# Patient Record
Sex: FEMALE | Race: WHITE | HISPANIC OR LATINO
[De-identification: names, ages, dates, MRNs, and addresses within clinical notes are randomized per-mention and may not be internally consistent; named-entity substitution may affect disease eponyms.]

---

## 2017-11-01 ENCOUNTER — TRANSCRIPTION ENCOUNTER (OUTPATIENT)
Age: 27
End: 2017-11-01

## 2018-06-11 PROBLEM — Z00.00 ENCOUNTER FOR PREVENTIVE HEALTH EXAMINATION: Status: ACTIVE | Noted: 2018-06-11

## 2018-07-03 ENCOUNTER — APPOINTMENT (OUTPATIENT)
Dept: ORTHOPEDIC SURGERY | Facility: CLINIC | Age: 28
End: 2018-07-03
Payer: MEDICAID

## 2018-07-03 VITALS
HEART RATE: 76 BPM | HEIGHT: 62 IN | SYSTOLIC BLOOD PRESSURE: 108 MMHG | WEIGHT: 135 LBS | BODY MASS INDEX: 24.84 KG/M2 | DIASTOLIC BLOOD PRESSURE: 66 MMHG

## 2018-07-03 DIAGNOSIS — M51.36 OTHER INTERVERTEBRAL DISC DEGENERATION, LUMBAR REGION: ICD-10-CM

## 2018-07-03 DIAGNOSIS — M76.899 OTHER SPECIFIED ENTHESOPATHIES OF UNSPECIFIED LOWER LIMB, EXCLUDING FOOT: ICD-10-CM

## 2018-07-03 PROCEDURE — 99204 OFFICE O/P NEW MOD 45 MIN: CPT

## 2018-07-03 PROCEDURE — 72100 X-RAY EXAM L-S SPINE 2/3 VWS: CPT

## 2018-07-03 RX ORDER — NAPROXEN 500 MG/1
500 TABLET ORAL
Qty: 30 | Refills: 0 | Status: ACTIVE | COMMUNITY
Start: 2018-02-27

## 2018-07-03 RX ORDER — NAPROXEN 500 MG/1
500 TABLET, DELAYED RELEASE ORAL
Qty: 20 | Refills: 0 | Status: ACTIVE | COMMUNITY
Start: 2018-04-14

## 2018-07-19 ENCOUNTER — OTHER (OUTPATIENT)
Age: 28
End: 2018-07-19

## 2018-07-19 DIAGNOSIS — M25.569 PAIN IN UNSPECIFIED KNEE: ICD-10-CM

## 2018-08-22 ENCOUNTER — APPOINTMENT (OUTPATIENT)
Dept: INTERNAL MEDICINE | Facility: CLINIC | Age: 28
End: 2018-08-22
Payer: MEDICAID

## 2018-08-22 VITALS
BODY MASS INDEX: 24.84 KG/M2 | WEIGHT: 135 LBS | OXYGEN SATURATION: 99 % | DIASTOLIC BLOOD PRESSURE: 60 MMHG | TEMPERATURE: 98.4 F | SYSTOLIC BLOOD PRESSURE: 100 MMHG | HEART RATE: 65 BPM | HEIGHT: 62 IN

## 2018-08-22 DIAGNOSIS — R20.2 PARESTHESIA OF SKIN: ICD-10-CM

## 2018-08-22 DIAGNOSIS — Z78.9 OTHER SPECIFIED HEALTH STATUS: ICD-10-CM

## 2018-08-22 PROCEDURE — 99204 OFFICE O/P NEW MOD 45 MIN: CPT

## 2018-08-22 NOTE — HISTORY OF PRESENT ILLNESS
[FreeTextEntry8] : pt here today with numbness in the right foot.  Symptoms started 7 days ago.\par Pt now in PT for right hip pain and previouly followed by orthopeist.\par Pt denies any weakness or pain.\par Denies any foot drop

## 2018-08-22 NOTE — ASSESSMENT
[FreeTextEntry1] : Tingling in the right foot- could be 2nd to pinched nerve. \par Pt to take NSAIDs and referral to neuro for evaluation. Possible EMG

## 2018-08-29 ENCOUNTER — OTHER (OUTPATIENT)
Age: 28
End: 2018-08-29

## 2018-09-06 ENCOUNTER — APPOINTMENT (OUTPATIENT)
Dept: ORTHOPEDIC SURGERY | Facility: CLINIC | Age: 28
End: 2018-09-06

## 2018-09-10 ENCOUNTER — OTHER (OUTPATIENT)
Age: 28
End: 2018-09-10

## 2018-11-13 ENCOUNTER — APPOINTMENT (OUTPATIENT)
Dept: FAMILY MEDICINE | Facility: CLINIC | Age: 28
End: 2018-11-13

## 2018-12-04 ENCOUNTER — OTHER (OUTPATIENT)
Age: 28
End: 2018-12-04

## 2018-12-04 ENCOUNTER — APPOINTMENT (OUTPATIENT)
Dept: FAMILY MEDICINE | Facility: CLINIC | Age: 28
End: 2018-12-04

## 2019-04-02 ENCOUNTER — TRANSCRIPTION ENCOUNTER (OUTPATIENT)
Age: 29
End: 2019-04-02

## 2019-06-07 ENCOUNTER — APPOINTMENT (OUTPATIENT)
Dept: OTOLARYNGOLOGY | Facility: CLINIC | Age: 29
End: 2019-06-07

## 2022-03-09 ENCOUNTER — EMERGENCY (EMERGENCY)
Facility: HOSPITAL | Age: 32
LOS: 1 days | Discharge: HOME | End: 2022-03-09
Attending: EMERGENCY MEDICINE
Payer: COMMERCIAL

## 2022-03-09 VITALS
TEMPERATURE: 98 F | OXYGEN SATURATION: 100 % | SYSTOLIC BLOOD PRESSURE: 115 MMHG | HEIGHT: 62 IN | DIASTOLIC BLOOD PRESSURE: 70 MMHG | WEIGHT: 125 LBS | RESPIRATION RATE: 18 BRPM | HEART RATE: 89 BPM

## 2022-03-09 LAB
ALBUMIN SERPL ELPH-MCNC: 4.1 G/DL — SIGNIFICANT CHANGE UP (ref 3.3–5.2)
ALP SERPL-CCNC: 82 U/L — SIGNIFICANT CHANGE UP (ref 40–120)
ALT FLD-CCNC: 9 U/L — SIGNIFICANT CHANGE UP
ANION GAP SERPL CALC-SCNC: 15 MMOL/L — SIGNIFICANT CHANGE UP (ref 5–17)
APTT BLD: 36 SEC — HIGH (ref 27.5–35.5)
AST SERPL-CCNC: 15 U/L — SIGNIFICANT CHANGE UP
BASOPHILS # BLD AUTO: 0.07 K/UL — SIGNIFICANT CHANGE UP (ref 0–0.2)
BASOPHILS NFR BLD AUTO: 0.6 % — SIGNIFICANT CHANGE UP (ref 0–2)
BILIRUB SERPL-MCNC: 0.4 MG/DL — SIGNIFICANT CHANGE UP (ref 0.4–2)
BUN SERPL-MCNC: 18.9 MG/DL — SIGNIFICANT CHANGE UP (ref 8–20)
CALCIUM SERPL-MCNC: 8.6 MG/DL — SIGNIFICANT CHANGE UP (ref 8.6–10.2)
CHLORIDE SERPL-SCNC: 102 MMOL/L — SIGNIFICANT CHANGE UP (ref 98–107)
CO2 SERPL-SCNC: 21 MMOL/L — LOW (ref 22–29)
CREAT SERPL-MCNC: 0.67 MG/DL — SIGNIFICANT CHANGE UP (ref 0.5–1.3)
EGFR: 118 ML/MIN/1.73M2 — SIGNIFICANT CHANGE UP
EOSINOPHIL # BLD AUTO: 0.45 K/UL — SIGNIFICANT CHANGE UP (ref 0–0.5)
EOSINOPHIL NFR BLD AUTO: 3.6 % — SIGNIFICANT CHANGE UP (ref 0–6)
GLUCOSE SERPL-MCNC: 83 MG/DL — SIGNIFICANT CHANGE UP (ref 70–99)
HCG SERPL-ACNC: <4 MIU/ML — SIGNIFICANT CHANGE UP
HCT VFR BLD CALC: 35.7 % — SIGNIFICANT CHANGE UP (ref 34.5–45)
HGB BLD-MCNC: 12 G/DL — SIGNIFICANT CHANGE UP (ref 11.5–15.5)
IMM GRANULOCYTES NFR BLD AUTO: 0.2 % — SIGNIFICANT CHANGE UP (ref 0–1.5)
INR BLD: 1.07 RATIO — SIGNIFICANT CHANGE UP (ref 0.88–1.16)
LYMPHOCYTES # BLD AUTO: 2.65 K/UL — SIGNIFICANT CHANGE UP (ref 1–3.3)
LYMPHOCYTES # BLD AUTO: 20.9 % — SIGNIFICANT CHANGE UP (ref 13–44)
MCHC RBC-ENTMCNC: 28.8 PG — SIGNIFICANT CHANGE UP (ref 27–34)
MCHC RBC-ENTMCNC: 33.6 GM/DL — SIGNIFICANT CHANGE UP (ref 32–36)
MCV RBC AUTO: 85.8 FL — SIGNIFICANT CHANGE UP (ref 80–100)
MONOCYTES # BLD AUTO: 0.98 K/UL — HIGH (ref 0–0.9)
MONOCYTES NFR BLD AUTO: 7.7 % — SIGNIFICANT CHANGE UP (ref 2–14)
NEUTROPHILS # BLD AUTO: 8.47 K/UL — HIGH (ref 1.8–7.4)
NEUTROPHILS NFR BLD AUTO: 67 % — SIGNIFICANT CHANGE UP (ref 43–77)
PLATELET # BLD AUTO: 315 K/UL — SIGNIFICANT CHANGE UP (ref 150–400)
POTASSIUM SERPL-MCNC: 3.6 MMOL/L — SIGNIFICANT CHANGE UP (ref 3.5–5.3)
POTASSIUM SERPL-SCNC: 3.6 MMOL/L — SIGNIFICANT CHANGE UP (ref 3.5–5.3)
PROT SERPL-MCNC: 6.9 G/DL — SIGNIFICANT CHANGE UP (ref 6.6–8.7)
PROTHROM AB SERPL-ACNC: 12.4 SEC — SIGNIFICANT CHANGE UP (ref 10.5–13.4)
RBC # BLD: 4.16 M/UL — SIGNIFICANT CHANGE UP (ref 3.8–5.2)
RBC # FLD: 14.7 % — HIGH (ref 10.3–14.5)
SODIUM SERPL-SCNC: 138 MMOL/L — SIGNIFICANT CHANGE UP (ref 135–145)
WBC # BLD: 12.65 K/UL — HIGH (ref 3.8–10.5)
WBC # FLD AUTO: 12.65 K/UL — HIGH (ref 3.8–10.5)

## 2022-03-09 PROCEDURE — 99285 EMERGENCY DEPT VISIT HI MDM: CPT

## 2022-03-09 PROCEDURE — 73701 CT LOWER EXTREMITY W/DYE: CPT | Mod: 26,RT,MA

## 2022-03-09 NOTE — ED ADULT NURSE REASSESSMENT NOTE - NS ED NURSE REASSESS COMMENT FT1
pt received A&Ox4. OOB self. Safety precautions maintained. Hourly rounding, call bell in reach, bed locked and in lowest position. Pt awaiting CT results.

## 2022-03-09 NOTE — ED STATDOCS - PROGRESS NOTE DETAILS
Bed side US + abscess to right inner thigh. Pt will have CT Right lower extremity to evaluate for deep tissue infection. Pt moved form intake Room. Pt seen and evaluated by intake Physician. HPI, Physical examination performed by intake Physician . Note reviewed and followup examination performed by me consistent with initial assessment. Agrees with intake Physician plan and tests. Pt case discussed with HANK Ruiz and will obtain CT and treat appropriate. CT + for cellulitis only,. pt is already on bactrim - nargis. sherry

## 2022-03-09 NOTE — ED ADULT TRIAGE NOTE - CHIEF COMPLAINT QUOTE
PT presents to ED c/o right inner thigh infection.  PT States had medial thigh lift 1/6 in [place]. Returned 2/17 to have stitches removed.  Area draining blood and pus.  Seen at urgent care and given bactrim and duracef. No fevers.

## 2022-03-09 NOTE — ED STATDOCS - PHYSICAL EXAMINATION
Gen: NAD, AOx3  Head: NCAT  HEENT: EOMI, oral mucosa moist, normal conjunctiva, neck supple  Lung: no respiratory distress  CV:  Normal perfusion  MSK: No edema, no visible deformities  Neuro: No focal neurologic deficits  Skin: Erythema along border of right surgical incision with mild warmth, small ulceration approximately 1x1 cm with serous drainage distal to surgical site, 2x2 area of fluctuance without significant tenderness.   Psych: normal affect

## 2022-03-09 NOTE — ED STATDOCS - PATIENT PORTAL LINK FT
You can access the FollowMyHealth Patient Portal offered by [facility] by registering at the following website: [email or web address]. By joining [employer]’s FollowMyHealth portal, you will also be able to view your health information using other applications (apps) compatible with our system.

## 2022-03-09 NOTE — ED ADULT NURSE NOTE - OBJECTIVE STATEMENT
33 yo female complaining of right inner thigh discomfort. Pt recently had thigh lift. Pt right inner thigh is hot to touch, red and swollen. It is accompanied by a 3 blister-like lesions. Pt denies n/v/d, and chills. Pt states she went to urgent care 1 days ago and was prescribed antibiotics and ointment to take and put on area that is affected.

## 2022-03-09 NOTE — ED STATDOCS - OBJECTIVE STATEMENT
31 y/o female with no PMHx presents to ED c/o post op complication. Patient reports she had a medial thigh lit in [place] on 1/06/22, and had the stitches removed on 2/17/22. Reports she has been having pain and redness to the area on her right thigh, so she saw her PMD today who prescribed Bactrim and Cefadroxil. Has only taken one dose of the abx.     Denies fevers

## 2022-03-09 NOTE — ED STATDOCS - ATTENDING CONTRIBUTION TO CARE
I, Dina Reyes, performed the initial face to face bedside interview with this patient regarding history of present illness, review of symptoms and relevant past medical, social and family history.  I completed an independent physical examination.   The medical decision making and follow-up on ordered tests (ie labs, radiologic studies) and re-evaluation of the patient's status has been communicated to the ACP.  Disposition of the patient will be based on test outcome and response to ED interventions.  The history, relevant review of systems, past medical and surgical history, medical decision making, and physical examination was documented by the scribe in my presence and I attest to the accuracy of the documentation.

## 2022-03-09 NOTE — ED STATDOCS - NS ED ROS FT
ROS: (+) right inner thigh redness and pain; no CP/SOB. no cough. no fever. no n/v/d/c. no abd pain. no rash. no bleeding. no urinary complaints. no weakness. no vision changes. no HA. no neck/back pain. no extremity swelling/deformity. No change in mental status.

## 2022-03-09 NOTE — ED STATDOCS - CLINICAL SUMMARY MEDICAL DECISION MAKING FREE TEXT BOX
Patient with cellulitis, questionable abscess vs seroma. Will perform bedside sono, possible I&D, only started abx today. Will recommend to continue and follow up outpatient with surgery. Patient with cellulitis, questionable abscess vs seroma. bedside sono noted for tracking deeper into thigh, CT ordered. labs. reasses

## 2022-03-09 NOTE — ED STATDOCS - NSFOLLOWUPINSTRUCTIONS_ED_ALL_ED_FT
Take Tylenol for pain every 6 hours   Take antibiotic as instructed   Monitor site for worsening condition     Follow up with Your MD within 1 week     return if new or worsening symptoms     Cellulitis    Cellulitis is a skin infection caused by bacteria. This condition occurs most often in the arms and lower legs but can occur anywhere over the body. Symptoms include redness, swelling, warm skin, tenderness, and chills/fever. If you were prescribed an antibiotic medicine, take it as told by your health care provider. Do not stop taking the antibiotic even if you start to feel better.    SEEK IMMEDIATE MEDICAL CARE IF YOU HAVE ANY OF THE FOLLOWING SYMPTOMS: worsening fever, red streaks coming from affected area, vomiting or diarrhea, or dizziness/lightheadedness.

## 2022-03-09 NOTE — ED STATDOCS - NSFOLLOWUPCLINICS_GEN_ALL_ED_FT
[facility]  Vascular Surgery  [address] [place] [address]  Phone: [phone]  Fax:

## 2022-03-10 VITALS
HEART RATE: 88 BPM | RESPIRATION RATE: 18 BRPM | TEMPERATURE: 99 F | SYSTOLIC BLOOD PRESSURE: 111 MMHG | DIASTOLIC BLOOD PRESSURE: 68 MMHG | OXYGEN SATURATION: 100 %

## 2022-03-10 LAB
BLD GP AB SCN SERPL QL: SIGNIFICANT CHANGE UP
DIR ANTIGLOB POLYSPECIFIC INTERPRETATION: SIGNIFICANT CHANGE UP
SARS-COV-2 RNA SPEC QL NAA+PROBE: SIGNIFICANT CHANGE UP

## 2022-03-10 PROCEDURE — 86077 PHYS BLOOD BANK SERV XMATCH: CPT

## 2022-03-15 LAB
ALLERGY+IMMUNOLOGY DIAG STUDY NOTE: SIGNIFICANT CHANGE UP
ANTIBODY INTERPRETATION 2: SIGNIFICANT CHANGE UP

## 2022-03-15 PROCEDURE — 85610 PROTHROMBIN TIME: CPT

## 2022-03-15 PROCEDURE — 85730 THROMBOPLASTIN TIME PARTIAL: CPT

## 2022-03-15 PROCEDURE — 87077 CULTURE AEROBIC IDENTIFY: CPT

## 2022-03-15 PROCEDURE — U0005: CPT

## 2022-03-15 PROCEDURE — 99284 EMERGENCY DEPT VISIT MOD MDM: CPT | Mod: 25

## 2022-03-15 PROCEDURE — 86901 BLOOD TYPING SEROLOGIC RH(D): CPT

## 2022-03-15 PROCEDURE — 80053 COMPREHEN METABOLIC PANEL: CPT

## 2022-03-15 PROCEDURE — 84702 CHORIONIC GONADOTROPIN TEST: CPT

## 2022-03-15 PROCEDURE — 87205 SMEAR GRAM STAIN: CPT

## 2022-03-15 PROCEDURE — 86900 BLOOD TYPING SEROLOGIC ABO: CPT

## 2022-03-15 PROCEDURE — 87075 CULTR BACTERIA EXCEPT BLOOD: CPT

## 2022-03-15 PROCEDURE — U0003: CPT

## 2022-03-15 PROCEDURE — 87070 CULTURE OTHR SPECIMN AEROBIC: CPT

## 2022-03-15 PROCEDURE — 86870 RBC ANTIBODY IDENTIFICATION: CPT

## 2022-03-15 PROCEDURE — 87186 SC STD MICRODIL/AGAR DIL: CPT

## 2022-03-15 PROCEDURE — 86880 COOMBS TEST DIRECT: CPT

## 2022-03-15 PROCEDURE — 73701 CT LOWER EXTREMITY W/DYE: CPT | Mod: MA

## 2022-03-15 PROCEDURE — 86905 BLOOD TYPING RBC ANTIGENS: CPT

## 2022-03-15 PROCEDURE — 85025 COMPLETE CBC W/AUTO DIFF WBC: CPT

## 2022-03-15 PROCEDURE — 86850 RBC ANTIBODY SCREEN: CPT

## 2022-03-15 PROCEDURE — 36415 COLL VENOUS BLD VENIPUNCTURE: CPT

## 2022-03-17 ENCOUNTER — APPOINTMENT (OUTPATIENT)
Dept: TRAUMA SURGERY | Facility: CLINIC | Age: 32
End: 2022-03-17

## 2022-03-21 NOTE — CHART NOTE - NSCHARTNOTEFT_GEN_A_CORE
Antibody Interpretation: Anti-M    Patient is a 32 year old female who presents to ED c/o right inner thigh infection.  PT States had medial thigh lift 1/6 in [place]. Returned 2/17 to have stitches removed.  Area draining blood and pus.  Seen at urgent care and given bactrim and duracef.  Blood sample received on 03/09/22 shows the patient is blood group O Rh(D) positive. The antibody screen is positive and anti-M (reacting optimally at 4°C) is identified in patient's plasma. Anti-M is an IgG and/or IgM antibody directed against the M antigen in the MNS blood group system. Anti-M is not clinically significant unless it reacts at the 37°C or AHG phase of testing. Patients with Anti-M should be provided red cells that are compatible through the AHG phase of testing. Crossmatch compatible red blood cells through the AHG phase of testing, negative for the M antigen, must be selected for transfusion. Please allow sufficient time for pre-transfusion blood bank workup.

## 2022-03-29 ENCOUNTER — EMERGENCY (EMERGENCY)
Facility: HOSPITAL | Age: 32
LOS: 1 days | Discharge: HOME | End: 2022-03-29
Attending: EMERGENCY MEDICINE
Payer: COMMERCIAL

## 2022-03-29 VITALS
OXYGEN SATURATION: 98 % | SYSTOLIC BLOOD PRESSURE: 117 MMHG | WEIGHT: 125 LBS | HEART RATE: 78 BPM | RESPIRATION RATE: 16 BRPM | HEIGHT: 62 IN | TEMPERATURE: 99 F | DIASTOLIC BLOOD PRESSURE: 68 MMHG

## 2022-03-29 PROCEDURE — 99283 EMERGENCY DEPT VISIT LOW MDM: CPT

## 2022-03-29 RX ORDER — CEPHALEXIN 500 MG
1 CAPSULE ORAL
Qty: 20 | Refills: 0
Start: 2022-03-29 | End: 2022-04-02

## 2022-03-29 RX ORDER — CEPHALEXIN 500 MG
500 CAPSULE ORAL ONCE
Refills: 0 | Status: COMPLETED | OUTPATIENT
Start: 2022-03-29 | End: 2022-03-29

## 2022-03-29 RX ADMIN — Medication 500 MILLIGRAM(S): at 12:06

## 2022-03-29 NOTE — ED PROVIDER NOTE - ATTENDING CONTRIBUTION TO CARE
medial thigh lift performed 1/6: reports problems since.  Finished 10 day course of antibiotics for cellulitis on 3/18.  reports that there is a small opening to drainage, reports a hard bump in the area of the scar with an area of fluctuance.  Denies fever. PE:  6cm healing incision to medial aspect of right upper thigh with secondary hyperpigmentation and a pencil-eraser sized opening with scant clear drainage,  minimally tender 2x3cm subcutaneous induration with central fluctuance distal to the incision.  A/P: wound infection: antibiotics and follow-up with surgery.

## 2022-03-29 NOTE — ED PROVIDER NOTE - NS ED ROS FT
Gen: denies fever, chills, fatigue, weight loss  Skin: +Surgical incision. denies rashes, laceration, bruising  HEENT: denies visual changes, ear pain, nasal congestion, throat pain  Respiratory: denies MICHAUD, SOB, cough, wheezing  Cardiovascular: denies chest pain, palpitations, diaphoresis, LE edema  MSK: denies joint swelling/pain, back pain, neck pain  Neuro: denies headache, dizziness, weakness, numbness

## 2022-03-29 NOTE — ED PROVIDER NOTE - CLINICAL SUMMARY MEDICAL DECISION MAKING FREE TEXT BOX
33yo F presenting for evaluation of surgical wound from 1/6, tx with 10 day course of bactrim for cellulitis on 3/9. significant improvement after bactrim as per pt. Incision appears well healed, no surrounding erythema or induration. no purulent drainage from pinpoint opening along incision. pt well appearing, no fever/chills. will cover with keflex, pt has surgical f/u tomorrow.

## 2022-03-29 NOTE — ED PROVIDER NOTE - NS ED ATTENDING STATEMENT MOD
This was a shared visit with the SANDY. I reviewed and verified the documentation and independently performed the documented:

## 2022-03-29 NOTE — ED PROVIDER NOTE - PATIENT PORTAL LINK FT
You can access the FollowMyHealth Patient Portal offered by [facility] by registering at the following website: [email or web address]. By joining [employer]’s FollowMyHealth portal, you will also be able to view your health information using other applications (apps) compatible with our system.

## 2022-03-29 NOTE — ED PROVIDER NOTE - OBJECTIVE STATEMENT
31yo F no pmhx presents to ED c/o for evaluation of right thigh surgical site. Pt underwent medial thigh lift in [place] on 1/06/22 and had the stitches removed on 2/17/22. Pt subsequently had pain and redness to the area on her right thigh, so she saw her PMD who prescribed Bactrim and Cefadroxil, took 1 dose at home then came to ED on 3/9, had ct performed which revealed cellulitis, no abscess or deep space infx. States she completed a 10 day course of Bactrim and redness and pain has resolved but she still feels "a lump" in her thigh and expressed a small amount of fluid yesterday from 1 small opening near incision. Sensitives performed from wound culture show sensitivity to 33yo F no pmhx presents to ED c/o for evaluation of right thigh surgical site. Pt underwent medial thigh lift in [place] on 1/06/22 and had the stitches removed on 2/17/22. Pt subsequently had pain and redness to the area on her right thigh, so she saw her PMD who prescribed Bactrim and Cefadroxil, took 1 dose at home then came to ED on 3/9, had ct performed which revealed cellulitis, no abscess or deep space infx. States she completed a 10 day course of Bactrim and redness and pain has resolved but she still feels "a lump" in her thigh and expressed a small amount of fluid yesterday from 1 small opening near incision. Sensitives performed from wound culture show sensitivity to Bactrim. Pt has video call f/u with her surgeon tomorrow. Denies fever, chills, erythema, groin pain, joint swelling.

## 2022-03-29 NOTE — ED PROVIDER NOTE - PHYSICAL EXAMINATION
Gen: No acute distress, non toxic  HEENT: Mucous membranes moist, pink conjunctivae, EOMI  Neuro: A&O x 3, moving all 4 extremities  MSK: No spine or joint tenderness to palpation  Skin: Well healed surgical incision to right proximal medial thigh with 1 pinpoint opening, small amount serous drainage when expressed. No fluctuance or erythema. no warmth. Small amount scar tissue/thickened subq tissue immediately surrounding incision. Gen: No acute distress, non toxic  HEENT: Mucous membranes moist, pink conjunctivae, EOMI  Neuro: A&O x 3, moving all 4 extremities  MSK: No spine or joint tenderness to palpation  Skin: Well healed surgical incision to right proximal medial thigh with 1 pinpoint opening, small amount serous drainage when expressed. No fluctuance or erythema. no warmth. Small amount indurated tissue distal to incision.

## 2022-03-29 NOTE — ED PROVIDER NOTE - NSFOLLOWUPINSTRUCTIONS_ED_ALL_ED_FT
- Follow up with your doctor within 2-3 days.   - Return to the ED for any new or worsening symptoms.   - Follow-up with surgeon via call tomorrow  - Please complete course of keflex 1 tab 4x/day for 5 days  - Apply warm compresses to area multiple times per day, keep area clean and dry

## 2022-03-29 NOTE — ED PROVIDER NOTE - PROGRESS NOTE DETAILS
Bedside sono performed, very small pocket seen, needle aspiration performed without significant purulent drainage.

## 2022-04-08 PROBLEM — Z78.9 OTHER SPECIFIED HEALTH STATUS: Chronic | Status: ACTIVE | Noted: 2022-03-29

## 2022-04-21 ENCOUNTER — APPOINTMENT (OUTPATIENT)
Dept: INTERNAL MEDICINE | Facility: CLINIC | Age: 32
End: 2022-04-21

## 2022-07-14 ENCOUNTER — NON-APPOINTMENT (OUTPATIENT)
Age: 32
End: 2022-07-14

## 2022-09-11 ENCOUNTER — NON-APPOINTMENT (OUTPATIENT)
Age: 32
End: 2022-09-11

## 2022-12-26 ENCOUNTER — FORM ENCOUNTER (OUTPATIENT)
Age: 32
End: 2022-12-26

## 2022-12-27 ENCOUNTER — FORM ENCOUNTER (OUTPATIENT)
Age: 32
End: 2022-12-27

## 2023-02-03 ENCOUNTER — NON-APPOINTMENT (OUTPATIENT)
Age: 33
End: 2023-02-03

## 2023-02-24 ENCOUNTER — NON-APPOINTMENT (OUTPATIENT)
Age: 33
End: 2023-02-24

## 2023-06-15 ENCOUNTER — NON-APPOINTMENT (OUTPATIENT)
Age: 33
End: 2023-06-15

## 2023-07-13 ENCOUNTER — NON-APPOINTMENT (OUTPATIENT)
Age: 33
End: 2023-07-13

## 2024-04-05 ENCOUNTER — NON-APPOINTMENT (OUTPATIENT)
Age: 34
End: 2024-04-05

## 2024-04-22 ENCOUNTER — NON-APPOINTMENT (OUTPATIENT)
Age: 34
End: 2024-04-22

## 2024-09-17 ENCOUNTER — NON-APPOINTMENT (OUTPATIENT)
Age: 34
End: 2024-09-17